# Patient Record
Sex: MALE | Race: WHITE | NOT HISPANIC OR LATINO | URBAN - METROPOLITAN AREA
[De-identification: names, ages, dates, MRNs, and addresses within clinical notes are randomized per-mention and may not be internally consistent; named-entity substitution may affect disease eponyms.]

---

## 2020-12-15 ENCOUNTER — EMERGENCY (EMERGENCY)
Facility: HOSPITAL | Age: 45
LOS: 1 days | Discharge: HOME | End: 2020-12-15
Attending: EMERGENCY MEDICINE | Admitting: EMERGENCY MEDICINE
Payer: COMMERCIAL

## 2020-12-15 VITALS
OXYGEN SATURATION: 99 % | HEIGHT: 69 IN | HEART RATE: 46 BPM | RESPIRATION RATE: 16 BRPM | TEMPERATURE: 99 F | SYSTOLIC BLOOD PRESSURE: 149 MMHG | WEIGHT: 164.91 LBS | DIASTOLIC BLOOD PRESSURE: 78 MMHG

## 2020-12-15 VITALS
OXYGEN SATURATION: 97 % | DIASTOLIC BLOOD PRESSURE: 88 MMHG | SYSTOLIC BLOOD PRESSURE: 144 MMHG | HEART RATE: 84 BPM | RESPIRATION RATE: 16 BRPM
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
## 2020-12-15 DIAGNOSIS — S01.81XA LACERATION WITHOUT FOREIGN BODY OF OTHER PART OF HEAD, INITIAL ENCOUNTER: ICD-10-CM
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
## 2020-12-15 DIAGNOSIS — S52.502A UNSPECIFIED FRACTURE OF THE LOWER END OF LEFT RADIUS, INITIAL ENCOUNTER FOR CLOSED FRACTURE: ICD-10-CM
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
## 2020-12-15 DIAGNOSIS — S62.111A DISPLACED FRACTURE OF TRIQUETRUM [CUNEIFORM] BONE, RIGHT WRIST, INITIAL ENCOUNTER FOR CLOSED FRACTURE: ICD-10-CM

## 2020-12-15 DIAGNOSIS — Z23 ENCOUNTER FOR IMMUNIZATION: ICD-10-CM

## 2020-12-15 DIAGNOSIS — S52.501A UNSPECIFIED FRACTURE OF THE LOWER END OF RIGHT RADIUS, INITIAL ENCOUNTER FOR CLOSED FRACTURE: ICD-10-CM

## 2020-12-15 DIAGNOSIS — M25.531 PAIN IN RIGHT WRIST: ICD-10-CM

## 2020-12-15 PROCEDURE — 12011 RPR F/E/E/N/L/M 2.5 CM/<: CPT | Mod: 59

## 2020-12-15 PROCEDURE — 99285 EMERGENCY DEPT VISIT HI MDM: CPT | Mod: 25,57

## 2020-12-15 PROCEDURE — 25600 CLTX DST RDL FX/EPHYS SEP WO: CPT | Mod: 50,54

## 2020-12-15 PROCEDURE — 73110 X-RAY EXAM OF WRIST: CPT | Mod: 26,50

## 2020-12-15 PROCEDURE — 73130 X-RAY EXAM OF HAND: CPT | Mod: 26,50

## 2020-12-15 PROCEDURE — 70450 CT HEAD/BRAIN W/O DYE: CPT | Mod: 26

## 2020-12-15 PROCEDURE — 70486 CT MAXILLOFACIAL W/O DYE: CPT | Mod: 26

## 2020-12-15 PROCEDURE — 73200 CT UPPER EXTREMITY W/O DYE: CPT | Mod: 26,LT

## 2020-12-15 PROCEDURE — 71045 X-RAY EXAM CHEST 1 VIEW: CPT | Mod: 26

## 2020-12-15 PROCEDURE — 25630 CLTX CARPL FX W/O MNPJ EA B1: CPT | Mod: 54

## 2020-12-15 RX ORDER — ACETAMINOPHEN 500 MG
975 TABLET ORAL ONCE
Refills: 0 | Status: COMPLETED | OUTPATIENT
Start: 2020-12-15 | End: 2020-12-15

## 2020-12-15 RX ORDER — TETANUS TOXOID, REDUCED DIPHTHERIA TOXOID AND ACELLULAR PERTUSSIS VACCINE, ADSORBED 5; 2.5; 8; 8; 2.5 [IU]/.5ML; [IU]/.5ML; UG/.5ML; UG/.5ML; UG/.5ML
0.5 SUSPENSION INTRAMUSCULAR ONCE
Refills: 0 | Status: COMPLETED | OUTPATIENT
Start: 2020-12-15 | End: 2020-12-15

## 2020-12-15 RX ADMIN — TETANUS TOXOID, REDUCED DIPHTHERIA TOXOID AND ACELLULAR PERTUSSIS VACCINE, ADSORBED 0.5 MILLILITER(S): 5; 2.5; 8; 8; 2.5 SUSPENSION INTRAMUSCULAR at 15:28

## 2020-12-15 RX ADMIN — Medication 975 MILLIGRAM(S): at 15:32
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
## 2020-12-15 NOTE — ED PROVIDER NOTE - PHYSICAL EXAMINATION
Gen: Well appearing, NAD  Head: ecchymosis to infralateral orbit on R, lac's as described below  HEENT: PERRL, MMM, normal conjunctiva, anicteric, neck supple, EOMI, no septal hematoma  Lung: CTAB, no adventitious sounds  CV: RRR, no murmurs  Abd: soft, NTND, no rebound or guarding, no CVAT  MSK: No edema, no visible deformities, no midline ttp mild swelling over L scaphoid w/ minimal ttp, full ROM.  Neuro: Moving all extremity grossly  Skin: 1 deep abrasion to R temple, 1cm lac to R forehead temple to subq, 2cm lac to R temple to subq Gen: Well appearing, NAD  Head: ecchymosis to infralateral orbit on R, lac's as described below  HEENT: PERRL, MMM, normal conjunctiva, anicteric, neck supple, EOMI, no septal hematoma  Lung: CTAB, no adventitious sounds  CV: RRR, no murmurs  Abd: soft, NTND, no rebound or guarding, no CVAT  MSK: No edema, no visible deformities, no midline ttp mild swelling over L distal radius w/ minimal ttp, full ROM. No scaphoid ttp  Neuro: Moving all extremity grossly  Skin: 1 deep abrasion to R temple, 1cm lac to R forehead temple to subq, 2cm lac to R temple to subq
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
## 2020-12-15 NOTE — ED ADULT NURSE NOTE - NSIMPLEMENTINTERV_GEN_ALL_ED
Implemented All Fall Risk Interventions:  [place] to call system. Call bell, personal items and telephone within reach. Instruct patient to call for assistance. Room bathroom lighting operational. Non-slip footwear when patient is off stretcher. Physically safe environment: no spills, clutter or unnecessary equipment. Stretcher in lowest position, wheels locked, appropriate side rails in place. Provide visual cue, wrist band, yellow gown, etc. Monitor gait and stability. Monitor for mental status changes and reorient to person, place, and time. Review medications for side effects contributing to fall risk. Reinforce activity limits and safety measures with patient and family.

## 2020-12-15 NOTE — ED PROVIDER NOTE - NS ED ROS FT
CONSTITUTIONAL: No fevers, no chills, no lightheadedness, no dizziness  EYES: no visual changes, no eye pain  EARS: no ear drainage, no ear pain, no change in hearing  NOSE: no nasal congestion  MOUTH/THROAT: no sore throat  CV: No chest pain, no palpitations  RESP: No SOB, no cough  GI: No n/v/d, no abd pain  : no dysuria, no hematuria, no flank pain  MSK: no back pain  SKIN: see hpi  NEURO: no focal weakness, no decreased sensation/parasthesias
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
## 2020-12-15 NOTE — ED PROVIDER NOTE - PATIENT PORTAL LINK FT
You can access the FollowMyHealth Patient Portal offered by [facility] by registering at the following website: [email or web address]. By joining [facility]’s FollowMyHealth portal, you will also be able to view your health information using other applications (apps) compatible with our system.

## 2020-12-15 NOTE — ED PROVIDER NOTE - WET READ LAUNCH FT
There are 3 Wet Read(s) to document. There are 2 Wet Read(s) to document. There are no Wet Read(s) to document.

## 2020-12-15 NOTE — ED PROVIDER NOTE - NSFOLLOWUPINSTRUCTIONS_ED_ALL_ED_FT
PLEASE FOLLOW UP WITH ORTHOPEDICS THIS WEEK WITH THE PROVIDED INFORMATION    SEE BELOW:    A wrist fracture is a break or crack in one of the bones of the wrist. The wrist is made up of eight small bones at the palm of the hand (carpal bones) and two long bones that make up the forearm (radius and ulna).    If the joint is stable and the bones are still in their normal position (nondisplaced), the injury may be treated with immobilization. This involves the use of a cast, splint, or sling to hold the wrist in place. Immobilization ensures that the bones continue to stay in the correct position while the wrist is healing.      What are the causes?  This condition may be caused by:  •A direct force to the wrist.  •Falling on an outstretched hand.  •Trauma, such as a car accident or a fall.    What increases the risk?  The following factors may make you more likely to develop this condition:  •Doing contact sports or high-risk sports such as skiing, biking, and ice skating.  •Taking steroid medicines.  •Smoking.  •Being female.  •Being .  •Drinking more than three alcoholic beverages per day.  •Having a condition that weakens the bones (osteoporosis).  •Being older.  •Having a history of previous fractures.    What are the signs or symptoms?  Symptoms of this condition include:  •Pain.  •Swelling.  •Bruising.  •Not being able to move the wrist normally.    Additionally, the wrist may hang in an odd position or appear deformed.      How is this diagnosed?    This condition may be diagnosed based on a physical exam and X-rays. You may also have a CT scan or MRI.      How is this treated?    Treatment for this condition involves wearing a cast, splint, or sling until the injured area is stable enough for you to begin range-of-motion exercises. You may also be prescribed pain medicine.      Follow these instructions at home:    If you have a cast:     • Do not stick anything inside the cast to scratch your skin. Doing that increases your risk of infection.      •Check the skin around the cast every day. Tell your health care provider about any concerns.      •You may put lotion on dry skin around the edges of the cast. Do not put lotion on the skin underneath the cast.      •Keep the cast clean and dry.      If you have a splint or sling:     •Wear the splint or sling as told by your health care provider. Remove it only as told by your health care provider.      •Loosen the splint or sling if your fingers tingle, become numb, or turn cold and blue.      •Keep the splint or sling clean and dry.      Bathing     • Do not take baths, swim, or use a hot tub until your health care provider approves. Ask your health care provider if you may take showers. You may only be allowed to take sponge baths.    •If your cast, splint, or sling is not waterproof:  •Do not let it get wet.      •Cover it with a watertight covering when you take a bath or a shower.        •If you have a sling, remove it for bathing only if your health care provider tells you it is safe to do that.        Managing pain, stiffness, and swelling    •If directed, put ice on the injured area.  •If you have a removable splint or sling, remove it as told by your health care provider.      •Put ice in a plastic bag.      •Place a towel between your skin and the bag or between your cast and the bag.      •Leave the ice on for 20 minutes, 2–3 times a day.        •Move your fingers often to avoid stiffness and to lessen swelling.      •Raise (elevate) the injured area above the level of your heart while you are sitting or lying down.      Driving     • Do not drive or use heavy machinery while taking prescription pain medicine.      •Ask your health care provider when it is safe to drive if you have a cast, splint, or sling on your wrist.      Activity     •Return to your normal activities as told by your health care provider. Ask your health care provider what activities are safe for you.      • Do not lift with your injured wrist until your health care provider approves.      •Do range-of-motion exercises only as told by your health care provider or physical therapist.      General instructions     • Do not put pressure on any part of the cast or splint until it is fully hardened. This may take several hours.      •Take over-the-counter and prescription medicines only as told by your health care provider.      • Do not use any products that contain nicotine or tobacco, such as cigarettes, e-cigarettes, and chewing tobacco. These can delay bone healing. If you need help quitting, ask your health care provider.    •If you were prescribed pain medicine, take steps to prevent or treat constipation. Your health care provider may recommend that you:  •Drink enough fluid to keep your urine pale yellow.      •Take over-the-counter or prescription medicines.      •Eat foods that are high in fiber, such as beans, whole grains, and fresh fruits and vegetables.      •Limit foods that are high in fat and processed sugars, such as fried or sweet foods.        •Keep all follow-up visits as told by your health care provider. This is important.        Contact a health care provider if:    •Your cast, splint, or sling is damaged or loose.      •You have any new pain, swelling, or bruising.      •Your pain, swelling, and bruising do not improve.      •You have a fever.      •You have chills.        Get help right away if:    •Your skin or fingers on your injured arm turn blue or gray.      •Your arm feels cold or numb.      •You have severe pain in your injured wrist.        Summary    •A wrist fracture is a break or crack in one of the bones of the wrist.      •If the joint is stable and the bones are still in their normal position, the injury may be treated by wearing a cast, splint, or sling.      •If you have a cast, check the skin around the cast every day. Tell your health care provider about any concerns.      •If you have a splint or sling, wear it as told by your health care provider. Remove it only as told by your health care provider.      This information is not intended to replace advice given to you by your health care provider. Make sure you discuss any questions you have with your health care provider.

## 2020-12-15 NOTE — ED PROVIDER NOTE - CARE PROVIDER_API CALL
Rj Estrada)  Orthopaedic Surgery  [address]  [place], [place] [address]  Phone: [phone]  Fax: [phone]  Follow Up Time: